# Patient Record
Sex: FEMALE | Race: WHITE | Employment: OTHER | URBAN - METROPOLITAN AREA
[De-identification: names, ages, dates, MRNs, and addresses within clinical notes are randomized per-mention and may not be internally consistent; named-entity substitution may affect disease eponyms.]

---

## 2019-03-04 ENCOUNTER — HOSPITAL ENCOUNTER (OUTPATIENT)
Dept: ULTRASOUND IMAGING | Age: 74
Discharge: HOME | End: 2019-03-04
Attending: INTERNAL MEDICINE
Payer: MEDICARE

## 2019-03-04 DIAGNOSIS — E04.2 MULTINODULAR GOITER: ICD-10-CM

## 2019-03-04 DIAGNOSIS — E04.1 THYROID NODULE: ICD-10-CM

## 2019-03-04 PROCEDURE — 76536 US EXAM OF HEAD AND NECK: CPT

## 2019-03-05 NOTE — PROGRESS NOTES
Talked to pt and informed her of US result, referral to Endocrinology and she will be notified by them to schedule appt.

## 2019-03-15 ENCOUNTER — PATIENT OUTREACH (OUTPATIENT)
Dept: CASE MANAGEMENT | Age: 74
End: 2019-03-15

## 2019-03-15 NOTE — PROGRESS NOTES
High risk review. Left voicemail with my name and number requesting a call back. This note will not be viewable in [address]

## 2019-03-26 PROBLEM — R06.02 SHORTNESS OF BREATH: Status: ACTIVE | Noted: 2019-03-26

## 2019-04-29 ENCOUNTER — HOSPITAL ENCOUNTER (OUTPATIENT)
Dept: GENERAL RADIOLOGY | Age: 74
Discharge: HOME | End: 2019-04-29
Attending: INTERNAL MEDICINE
Payer: MEDICARE

## 2019-04-29 DIAGNOSIS — R06.00 DYSPNEA, UNSPECIFIED TYPE: ICD-10-CM

## 2019-04-29 PROBLEM — E66.01 SEVERE OBESITY (HCC): Status: ACTIVE | Noted: 2019-04-29

## 2019-04-29 PROCEDURE — 71046 X-RAY EXAM CHEST 2 VIEWS: CPT

## 2019-05-05 ENCOUNTER — HOSPITAL ENCOUNTER (OUTPATIENT)
Dept: SLEEP MEDICINE | Age: 74
Discharge: HOME | End: 2019-05-05
Payer: MEDICARE

## 2019-05-05 PROCEDURE — 95810 POLYSOM 6/> YRS 4/> PARAM: CPT

## 2019-05-07 PROBLEM — E11.40 TYPE 2 DIABETES MELLITUS WITH DIABETIC NEUROPATHY (HCC): Status: ACTIVE | Noted: 2019-05-07

## 2019-05-07 PROBLEM — E11.40 TYPE 2 DIABETES MELLITUS WITH DIABETIC NEUROPATHY (HCC): Status: RESOLVED | Noted: 2019-05-07 | Resolved: 2019-05-07

## 2019-05-07 PROBLEM — R06.02 SHORTNESS OF BREATH: Status: RESOLVED | Noted: 2019-03-26 | Resolved: 2019-05-07

## 2019-07-02 ENCOUNTER — HOSPITAL ENCOUNTER (OUTPATIENT)
Dept: MAMMOGRAPHY | Age: 74
Discharge: HOME | End: 2019-07-02
Attending: INTERNAL MEDICINE
Payer: MEDICARE

## 2019-07-02 DIAGNOSIS — M94.9 DISORDER OF BONE AND CARTILAGE: ICD-10-CM

## 2019-07-02 DIAGNOSIS — Z12.31 VISIT FOR SCREENING MAMMOGRAM: ICD-10-CM

## 2019-07-02 DIAGNOSIS — M89.9 DISORDER OF BONE AND CARTILAGE: ICD-10-CM

## 2019-07-02 PROCEDURE — 77067 SCR MAMMO BI INCL CAD: CPT

## 2019-07-02 PROCEDURE — 77080 DXA BONE DENSITY AXIAL: CPT

## 2019-07-02 NOTE — PROGRESS NOTES
Bone density revealed osteoporosis; schedule ov to discuss results and treatment options; recommend daily calcium and vit D supplement weight bearing exercises.

## 2019-07-03 NOTE — PROGRESS NOTES
Talked to pt and informed her of BMD results, Dr. Dinesh Carter instructions and scheduled her for appt to discuss treatment options on Friday @ 9:40am.

## 2019-08-14 PROBLEM — Z99.89 OSA ON CPAP: Status: ACTIVE | Noted: 2019-08-14

## 2019-08-14 PROBLEM — G47.33 OSA ON CPAP: Status: ACTIVE | Noted: 2019-08-14

## 2020-11-10 PROBLEM — E11.21 TYPE 2 DIABETES WITH NEPHROPATHY (HCC): Status: ACTIVE | Noted: 2020-11-10

## 2021-08-12 ENCOUNTER — HOSPITAL ENCOUNTER (OUTPATIENT)
Dept: MAMMOGRAPHY | Age: 76
Discharge: HOME | End: 2021-08-12
Attending: INTERNAL MEDICINE
Payer: MEDICARE

## 2021-08-12 DIAGNOSIS — Z12.31 VISIT FOR SCREENING MAMMOGRAM: ICD-10-CM

## 2021-08-12 DIAGNOSIS — M81.0 AGE-RELATED OSTEOPOROSIS WITHOUT CURRENT PATHOLOGICAL FRACTURE: ICD-10-CM

## 2021-08-12 PROCEDURE — 77080 DXA BONE DENSITY AXIAL: CPT

## 2021-08-12 PROCEDURE — 77067 SCR MAMMO BI INCL CAD: CPT

## 2021-08-16 NOTE — PROGRESS NOTES
Talked to pt and informed her, per Dr. Eric Bruce, bone density revealed osteoporosis, will review report in detail at upcoming visit.

## 2022-03-18 PROBLEM — E11.21 TYPE 2 DIABETES WITH NEPHROPATHY (HCC): Status: ACTIVE | Noted: 2020-11-10

## 2022-03-19 PROBLEM — Z99.89 OSA ON CPAP: Status: ACTIVE | Noted: 2019-08-14

## 2022-03-19 PROBLEM — G47.33 OSA ON CPAP: Status: ACTIVE | Noted: 2019-08-14

## 2022-03-19 PROBLEM — E66.01 SEVERE OBESITY (HCC): Status: ACTIVE | Noted: 2019-04-29

## 2022-08-19 ENCOUNTER — OFFICE VISIT (OUTPATIENT)
Dept: INTERNAL MEDICINE CLINIC | Facility: CLINIC | Age: 77
End: 2022-08-19
Payer: MEDICARE

## 2022-08-19 VITALS
RESPIRATION RATE: 18 BRPM | HEART RATE: 82 BPM | SYSTOLIC BLOOD PRESSURE: 150 MMHG | WEIGHT: 181 LBS | TEMPERATURE: 97 F | BODY MASS INDEX: 33.31 KG/M2 | HEIGHT: 62 IN | OXYGEN SATURATION: 98 % | DIASTOLIC BLOOD PRESSURE: 100 MMHG

## 2022-08-19 DIAGNOSIS — Z12.31 SCREENING MAMMOGRAM FOR BREAST CANCER: ICD-10-CM

## 2022-08-19 DIAGNOSIS — M81.0 AGE-RELATED OSTEOPOROSIS WITHOUT CURRENT PATHOLOGICAL FRACTURE: ICD-10-CM

## 2022-08-19 DIAGNOSIS — R42 VERTIGO: ICD-10-CM

## 2022-08-19 DIAGNOSIS — T78.2XXA ACUTE ANAPHYLAXIS, INITIAL ENCOUNTER: ICD-10-CM

## 2022-08-19 DIAGNOSIS — I27.20 PULMONARY HYPERTENSION (HCC): ICD-10-CM

## 2022-08-19 DIAGNOSIS — Z79.4 TYPE 2 DIABETES MELLITUS WITH DIABETIC NEPHROPATHY, WITH LONG-TERM CURRENT USE OF INSULIN (HCC): ICD-10-CM

## 2022-08-19 DIAGNOSIS — I10 ESSENTIAL HYPERTENSION: ICD-10-CM

## 2022-08-19 DIAGNOSIS — E11.21 TYPE 2 DIABETES MELLITUS WITH DIABETIC NEPHROPATHY, WITH LONG-TERM CURRENT USE OF INSULIN (HCC): ICD-10-CM

## 2022-08-19 DIAGNOSIS — Z00.00 MEDICARE ANNUAL WELLNESS VISIT, SUBSEQUENT: Primary | ICD-10-CM

## 2022-08-19 PROBLEM — J44.1 CHRONIC OBSTRUCTIVE PULMONARY DISEASE WITH (ACUTE) EXACERBATION (HCC): Status: ACTIVE | Noted: 2022-08-19

## 2022-08-19 PROCEDURE — G8399 PT W/DXA RESULTS DOCUMENT: HCPCS | Performed by: NURSE PRACTITIONER

## 2022-08-19 PROCEDURE — G8427 DOCREV CUR MEDS BY ELIG CLIN: HCPCS | Performed by: NURSE PRACTITIONER

## 2022-08-19 PROCEDURE — 3017F COLORECTAL CA SCREEN DOC REV: CPT | Performed by: NURSE PRACTITIONER

## 2022-08-19 PROCEDURE — 3046F HEMOGLOBIN A1C LEVEL >9.0%: CPT | Performed by: NURSE PRACTITIONER

## 2022-08-19 PROCEDURE — G8417 CALC BMI ABV UP PARAM F/U: HCPCS | Performed by: NURSE PRACTITIONER

## 2022-08-19 PROCEDURE — 1123F ACP DISCUSS/DSCN MKR DOCD: CPT | Performed by: NURSE PRACTITIONER

## 2022-08-19 PROCEDURE — 2022F DILAT RTA XM EVC RTNOPTHY: CPT | Performed by: NURSE PRACTITIONER

## 2022-08-19 PROCEDURE — 1090F PRES/ABSN URINE INCON ASSESS: CPT | Performed by: NURSE PRACTITIONER

## 2022-08-19 PROCEDURE — G0439 PPPS, SUBSEQ VISIT: HCPCS | Performed by: NURSE PRACTITIONER

## 2022-08-19 PROCEDURE — 1036F TOBACCO NON-USER: CPT | Performed by: NURSE PRACTITIONER

## 2022-08-19 PROCEDURE — 99214 OFFICE O/P EST MOD 30 MIN: CPT | Performed by: NURSE PRACTITIONER

## 2022-08-19 RX ORDER — MECLIZINE HYDROCHLORIDE 25 MG/1
25 TABLET ORAL 3 TIMES DAILY PRN
Qty: 30 TABLET | Refills: 0 | Status: SHIPPED | OUTPATIENT
Start: 2022-08-19 | End: 2022-08-29

## 2022-08-19 RX ORDER — ALENDRONATE SODIUM 70 MG/1
70 TABLET ORAL
Qty: 13 TABLET | Refills: 1 | Status: SHIPPED | OUTPATIENT
Start: 2022-08-19

## 2022-08-19 RX ORDER — EPINEPHRINE 0.15 MG/.3ML
INJECTION INTRAMUSCULAR
Qty: 2 EACH | Refills: 2 | Status: SHIPPED | OUTPATIENT
Start: 2022-08-19

## 2022-08-19 RX ORDER — OYSTER SHELL CALCIUM WITH VITAMIN D 500; 200 MG/1; [IU]/1
1 TABLET, FILM COATED ORAL DAILY
COMMUNITY

## 2022-08-19 RX ORDER — INSULIN DEGLUDEC 200 U/ML
INJECTION, SOLUTION SUBCUTANEOUS
COMMUNITY
Start: 2021-09-30 | End: 2022-08-19 | Stop reason: SDUPTHER

## 2022-08-19 RX ORDER — INSULIN DEGLUDEC 200 U/ML
30 INJECTION, SOLUTION SUBCUTANEOUS DAILY
Qty: 27 ML | Refills: 1 | Status: SHIPPED | OUTPATIENT
Start: 2022-08-19

## 2022-08-19 RX ORDER — ALENDRONATE SODIUM 70 MG/1
TABLET ORAL
COMMUNITY
End: 2022-08-19 | Stop reason: SDUPTHER

## 2022-08-19 RX ORDER — OLMESARTAN MEDOXOMIL 40 MG/1
40 TABLET ORAL DAILY
COMMUNITY
End: 2022-08-19 | Stop reason: SDUPTHER

## 2022-08-19 RX ORDER — ATENOLOL 50 MG/1
75 TABLET ORAL DAILY
Qty: 135 TABLET | Refills: 1 | Status: SHIPPED | OUTPATIENT
Start: 2022-08-19

## 2022-08-19 RX ORDER — OLMESARTAN MEDOXOMIL 40 MG/1
40 TABLET ORAL DAILY
Qty: 90 TABLET | Refills: 1 | Status: SHIPPED | OUTPATIENT
Start: 2022-08-19

## 2022-08-19 ASSESSMENT — PATIENT HEALTH QUESTIONNAIRE - PHQ9
SUM OF ALL RESPONSES TO PHQ QUESTIONS 1-9: 1
2. FEELING DOWN, DEPRESSED OR HOPELESS: 0
SUM OF ALL RESPONSES TO PHQ QUESTIONS 1-9: 1
1. LITTLE INTEREST OR PLEASURE IN DOING THINGS: 1
SUM OF ALL RESPONSES TO PHQ QUESTIONS 1-9: 1
SUM OF ALL RESPONSES TO PHQ9 QUESTIONS 1 & 2: 1
SUM OF ALL RESPONSES TO PHQ QUESTIONS 1-9: 1

## 2022-08-19 ASSESSMENT — LIFESTYLE VARIABLES
HOW MANY STANDARD DRINKS CONTAINING ALCOHOL DO YOU HAVE ON A TYPICAL DAY: PATIENT DOES NOT DRINK
HOW OFTEN DO YOU HAVE A DRINK CONTAINING ALCOHOL: NEVER

## 2022-08-19 NOTE — PATIENT INSTRUCTIONS
Personalized Preventive Plan for Allyson Zaman - 8/19/2022  Medicare offers a range of preventive health benefits. Some of the tests and screenings are paid in full while other may be subject to a deductible, co-insurance, and/or copay. Some of these benefits include a comprehensive review of your medical history including lifestyle, illnesses that may run in your family, and various assessments and screenings as appropriate. After reviewing your medical record and screening and assessments performed today your provider may have ordered immunizations, labs, imaging, and/or referrals for you. A list of these orders (if applicable) as well as your Preventive Care list are included within your After Visit Summary for your review. Other Preventive Recommendations:    A preventive eye exam performed by an eye specialist is recommended every 1-2 years to screen for glaucoma; cataracts, macular degeneration, and other eye disorders. A preventive dental visit is recommended every 6 months. Try to get at least 150 minutes of exercise per week or 10,000 steps per day on a pedometer . Order or download the FREE \"Exercise & Physical Activity: Your Everyday Guide\" from [employer]. Call 7-412.828.9309 or search [employer] online. You need 6199-5738 mg of calcium and 2839-0960 IU of vitamin D per day. It is possible to meet your calcium requirement with diet alone, but a vitamin D supplement is usually necessary to meet this goal.  When exposed to the sun, use a sunscreen that protects against both UVA and UVB radiation with an SPF of 30 or greater. Reapply every 2 to 3 hours or after sweating, drying off with a towel, or swimming. Always wear a seat belt when traveling in a car. Always wear a helmet when riding a bicycle or motorcycle.

## 2022-08-19 NOTE — PROGRESS NOTES
Medicare Annual Wellness Visit    Stew Tabares is here for Medicare AWV (A1c check)    Assessment & Plan   Medicare annual wellness visit, subsequent    Type 2 diabetes mellitus with diabetic nephropathy, with long-term current use of insulin (HCC)  -     Insulin Degludec (TRESIBA FLEXTOUCH) 200 UNIT/ML SOPN; Inject 30 Units into the skin daily, Disp-27 mL, R-1Normal  -     SITagliptin (JANUVIA) 100 MG tablet; Take 1 tablet by mouth daily, Disp-90 tablet, R-1Normal  -     Comprehensive Metabolic Panel; Future  -     Microalbumin / Creatinine Urine Ratio; Future  -     Hemoglobin A1C; Future  -      DIABETES FOOT EXAM  Check A1c next week. Continue Jardiance as prescribed. Administer Tresiba at 30 units daily. Patient is to call if problems with hypoglycemia. Essential hypertension  -     atenolol (TENORMIN) 50 MG tablet; Take 1.5 tablets by mouth daily, Disp-135 tablet, R-1Normal  -     olmesartan (BENICAR) 40 MG tablet; Take 1 tablet by mouth daily, Disp-90 tablet, R-1Normal  -     CBC with Auto Differential; Future  -     Comprehensive Metabolic Panel; Future  -     Lipid Panel; Future  -     T4, Free; Future  -     TSH; Future  -     Urinalysis; Future  Continue olmesartan. Increase atenolol to 75 mg daily. Patient instructed to monitor BP at home and call for readings consistently greater than 140/90. Pulmonary hypertension ([facility] [address]  -     External Referral to Pulmonology    Age-related osteoporosis without current pathological fracture  -     alendronate (FOSAMAX) 70 MG tablet; Take 1 tablet by mouth every 7 days, Disp-13 tablet, R-1Normal    Vertigo  -     meclizine (ANTIVERT) 25 MG tablet; Take 1 tablet by mouth 3 times daily as needed for Dizziness or Nausea, Disp-30 tablet, R-0Normal  Take meclizine as needed. Screening mammogram for breast cancer  -     [facility] DIGITAL SCREEN W OR WO CAD BILATERAL;  Future    Acute anaphylaxis, initial encounter  -     EPINEPHrine (EPIPEN JR) 0.15 MG/0.3ML SOAJ; Inject 0.3 mL (0.15 mg) into the muscle once as needed for anaphylaxis. , Disp-2 each, R-2Normal        Recommendations for Preventive Services Due: see orders and patient instructions/AVS.  Recommended screening schedule for the next 5-10 years is provided to the patient in written form: see Patient Instructions/AVS.     Return in 6 months (on 2/19/2023). Subjective   The following acute and/or chronic problems were also addressed today:    Patient is due for fasting labs. Most recent A1c (July 2021) was 7.5%. Patient is due for repeat fasting lab work. Fasting blood sugar readings at home have been in the range of 100-150 with occasional readings up to 200 which occurs when she eats ice cream or other desserts before bed. She reports compliance with Jardiance as prescribed. Admits that she administers a different amount of Tresiba daily based on what her morning blood sugar is. She reports administering between 20-30 units daily typically. BP is elevated today in the office. She checks her BP 2-3 times weekly with readings ranging from 150/160s/. Denies any CP. Reports chronic SOB with exertion. Previously diagnosed with pulmonary hypertension. Daughter, who is present today, is requesting referral to pulmonary at [facility] for further evaluation and management of her SOB. Compliant with Fosamax as prescribed. Denies any recent falls. History of vertigo. Patient reports two recent episodes, both of which were relieved by patient lying down and taking a nap. Reports right ear tinnitus as well. Patient's complete Health Risk Assessment and screening values have been reviewed and are found in Flowsheets. The following problems were reviewed today and where indicated follow up appointments were made and/or referrals ordered. Labs reviewed and discussed. Medications refilled as needed during office visit or adjusted based on lab results and/or our discussion as appropriate. See discussion.       Positive Risk Factor Screenings with Interventions:             General Health and ACP:  General  In general, how would you say your health is?: Good  In the past 7 days, have you experienced any of the following: New or Increased Pain, New or Increased Fatigue, Loneliness, Social Isolation, Stress or Anger?: (!) Yes  Select all that apply: (!) New or Increased Fatigue  Do you get the social and emotional support that you need?: Yes  Do you have a Living Will?: Yes    Advance Directives       Power of  Living Will ACP-Advance Directive ACP-Power of     Not on File Not on File Not on File Not on File          General Health Risk Interventions:  Fatigue: regular exercise recommended- 3-5 times per week, 30-45 minutes per session, patient declines any further evaluation/treatment for this issue    Health Habits/Nutrition:  Physical Activity: Insufficiently Active    Days of Exercise per Week: 3 days    Minutes of Exercise per Session: 20 min     Have you lost any weight without trying in the past 3 months?: No  Body mass index: (!) 33.1  Have you seen the dentist within the past year?: (!) No  Health Habits/Nutrition Interventions:  Inadequate physical activity:  educational materials provided to promote increased physical activity  Nutritional issues:  educational materials for healthy, well-balanced diet provided      ADLs:  In the past 7 days, did you need help from others to perform any of the following everyday activities: Eating, dressing, grooming, bathing, toileting, or walking/balance?: No  In the past 7 days, did you need help from others to take care of any of the following: Laundry, housekeeping, banking/finances, shopping, telephone use, food preparation, transportation, or taking medications?: (!) Yes  Select all that apply: (!) Transportation  ADL Interventions:  Patient declines any further evaluation/treatment for this issue      Review of Systems   Constitutional: Positive for fatigue. Negative for chills, fever and unexpected weight change. HENT:  Positive for tinnitus. Negative for congestion, ear pain and sore throat. Respiratory:  Positive for shortness of breath. Negative for cough and wheezing. Cardiovascular:  Negative for chest pain, palpitations and leg swelling. Gastrointestinal:  Negative for abdominal pain, constipation, diarrhea, nausea and vomiting. Genitourinary:  Negative for dysuria and hematuria. Neurological:  Positive for dizziness. Negative for light-headedness and headaches. Objective   Vitals:    08/19/22 1441   BP: (!) 150/100   Site: Left Upper Arm   Position: Sitting   Cuff Size: Medium Adult   Pulse: 82   Resp: 18   Temp: 97 °F (36.1 °C)   TempSrc: Temporal   SpO2: 98%   Weight: 181 lb (82.1 kg)   Height: 5' 2\" (1.575 m)      Body mass index is 33.11 kg/m². Physical Exam  Vitals and nursing note reviewed. Constitutional:       General: She is not in acute distress. Appearance: Normal appearance. HENT:      Right Ear: Tympanic membrane, ear canal and external ear normal.      Left Ear: Tympanic membrane, ear canal and external ear normal.      Nose: Nose normal.      Mouth/Throat:      Mouth: Mucous membranes are moist.   Cardiovascular:      Rate and Rhythm: Normal rate and regular rhythm. Pulses:           Dorsalis pedis pulses are 2+ on the right side and 2+ on the left side. Heart sounds: Normal heart sounds. Pulmonary:      Effort: Pulmonary effort is normal.      Breath sounds: Normal breath sounds. Abdominal:      General: Bowel sounds are normal.      Palpations: Abdomen is soft. Tenderness: There is no abdominal tenderness. Musculoskeletal:      Right lower leg: No edema. Left lower leg: No edema. Feet:      Right foot:      Protective Sensation: 10 sites tested. 10 sites sensed. Skin integrity: Skin integrity normal.      Toenail Condition: Fungal disease present. Left foot:      Protective Sensation: 10 sites tested. 10 sites sensed. Skin integrity: Skin integrity normal.      Toenail Condition: Fungal disease present. Skin:     General: Skin is warm and dry. Neurological:      Mental Status: She is alert and oriented to person, place, and time. Cranial Nerves: No cranial nerve deficit. Psychiatric:         Mood and Affect: Mood normal.            Allergies   Allergen Reactions    Other Anaphylaxis and Rash     Latex): Anaphylactic shock-Allergy    Diazepam Other (See Comments)     Adverse Reaction - makes pt hyper. Pseudoephedrine Hcl Other (See Comments)     Altered Mental State    Sulfamethoxazole-Trimethoprim Hives and Swelling     Prior to Visit Medications    Medication Sig Taking? Authorizing Provider   calcium-vitamin D (OSCAL-500) 500-200 MG-UNIT per tablet Take 1 tablet by mouth daily Yes Historical Provider, MD   alendronate (FOSAMAX) 70 MG tablet Take 1 tablet by mouth every 7 days Yes ESTHER Babcock CNP   atenolol (TENORMIN) 50 MG tablet Take 1.5 tablets by mouth daily Yes ESTHER Babcock CNP   EPINEPHrine (EPIPEN JR) 0.15 MG/0.3ML SOAJ Inject 0.3 mL (0.15 mg) into the muscle once as needed for anaphylaxis.  Yes ESTHER Babcock CNP   Insulin Degludec (TRESIBA FLEXTOUCH) 200 UNIT/ML SOPN Inject 30 Units into the skin daily Yes ESTHER Babcock CNP   olmesartan (BENICAR) 40 MG tablet Take 1 tablet by mouth daily Yes ESTHER Babcock CNP   SITagliptin (JANUVIA) 100 MG tablet Take 1 tablet by mouth daily Yes ESTHER Babcock CNP   meclizine (ANTIVERT) 25 MG tablet Take 1 tablet by mouth 3 times daily as needed for Dizziness or Nausea Yes ESTHER Babcock CNP   Lancets MISC Check fs tid; Freestyle lite; DM2, E11.9 Yes Ar Automatic Reconciliation   acetaminophen (TYLENOL) 325 MG tablet Take by mouth every 4 hours as needed Yes Ar Automatic Reconciliation   ergocalciferol (ERGOCALCIFEROL) 1.25 MG (56752 UT) capsule TAKE 1 CAP BY MOUTH EVERY SEVEN (7) DAYS FOR 90 DAYS. Yes Ar Automatic Reconciliation   gabapentin (NEURONTIN) 100 MG capsule Take 300 mg by mouth.   Patient not taking: Reported on 8/19/2022  Ar Automatic Reconciliation       CareTeam (Including outside providers/suppliers regularly involved in providing care):   Patient Care Team:  Kandy Hou MD as PCP - Zeynep Meade MD as PCP - [facility] Empaneled Provider     Reviewed and updated this visit:  Allergies  Meds  Med Hx  Surg Hx  Soc Hx  Fam Hx

## 2022-08-21 ASSESSMENT — ENCOUNTER SYMPTOMS
SHORTNESS OF BREATH: 1
COUGH: 0
CONSTIPATION: 0
VOMITING: 0
ABDOMINAL PAIN: 0
WHEEZING: 0
NAUSEA: 0
DIARRHEA: 0
SORE THROAT: 0

## 2022-08-24 LAB
AVERAGE GLUCOSE: ABNORMAL
HBA1C MFR BLD: 8 %

## 2022-08-26 ENCOUNTER — TELEPHONE (OUTPATIENT)
Dept: INTERNAL MEDICINE CLINIC | Facility: CLINIC | Age: 77
End: 2022-08-26

## 2022-08-26 NOTE — TELEPHONE ENCOUNTER
Pt was called and she would like to have the adult Epipen of 0.3mg. She was taking Epipen Jr 0.15. I will call the pharmacy to make the change.     Thank you    Marisol Massey

## 2022-08-30 ENCOUNTER — TELEPHONE (OUTPATIENT)
Dept: INTERNAL MEDICINE CLINIC | Facility: CLINIC | Age: 77
End: 2022-08-30

## 2022-08-30 NOTE — TELEPHONE ENCOUNTER
[facility] the nurse coordinator from Pulmonary hypertension of [facility] called in regards to the pt of not knowing why she is being referred for pulmonary hypertension. The nurse coordinator not making an appointment until she gets a phone call back. Please advise so I can call her back with a reason.     Thank you    Gavin Gordon

## 2022-08-30 NOTE — TELEPHONE ENCOUNTER
I have spoken with Amy Doyle and she mention the pt has to have had an echo or right heart cath for them to view pulmonary hypertension. . She is unable to view stress test for pulmonary hypertension.     Thank you    Andrea Briggs

## 2022-09-02 ENCOUNTER — TELEPHONE (OUTPATIENT)
Dept: INTERNAL MEDICINE CLINIC | Facility: CLINIC | Age: 77
End: 2022-09-02

## 2022-09-02 ENCOUNTER — HOSPITAL ENCOUNTER (OUTPATIENT)
Dept: MAMMOGRAPHY | Age: 77
Discharge: HOME | End: 2022-09-05
Payer: MEDICARE

## 2022-09-02 DIAGNOSIS — Z12.31 SCREENING MAMMOGRAM FOR BREAST CANCER: ICD-10-CM

## 2022-09-02 DIAGNOSIS — R06.02 CHRONIC SHORTNESS OF BREATH: Primary | ICD-10-CM

## 2022-09-02 PROCEDURE — 77067 SCR MAMMO BI INCL CAD: CPT

## 2022-09-02 NOTE — TELEPHONE ENCOUNTER
Please call patient to let her know that we received her lab work from [employer]. Kidney and liver function normal. WBC count slightly elevated. Please confirm no signs/symptoms of infection. LDL bad cholesterol slightly elevated. Recommend low fat diet and exercise as tolerated. A1c is at 8.0%. I would like for her to continue Jardiance as prescribed and adminster her Tresiba 30 units daily as we discussed.  Thyroid is normal.

## 2022-09-06 NOTE — TELEPHONE ENCOUNTER
Pt was finally notified about lab results. She have a scrape from concrete that she have applying neosporin on a week from tomorrow. The pt says the wound is healing nicely.     Thank you    Emily Santacruz

## 2022-09-06 NOTE — TELEPHONE ENCOUNTER
Good morning,    I have spoken with Blayne Klein this morning. A copy of the pt echocardiogram report will be faxed over today to be looked at.     Thank you    Sendy Sherman

## 2022-09-07 ENCOUNTER — CLINICAL DOCUMENTATION (OUTPATIENT)
Dept: INTERNAL MEDICINE CLINIC | Facility: CLINIC | Age: 77
End: 2022-09-07

## 2022-09-07 NOTE — PROGRESS NOTES
Pt new Rx for Epinephrine Auto-injector 2 pk will be dispensed as a quantity of 1 Inject 0.3 ml(0.3 mg) into the muscle once as needed for anaphylaxis with 2 refills.

## 2022-10-12 ENCOUNTER — TELEPHONE (OUTPATIENT)
Dept: INTERNAL MEDICINE CLINIC | Facility: CLINIC | Age: 77
End: 2022-10-12

## 2022-10-12 NOTE — TELEPHONE ENCOUNTER
Dr. Arina Sprague from [facility] in [facility] would like you to call him to discuss this mutual pt. He says his consult notes are in [place]. Please give him a call. Thank you.

## 2023-01-23 ENCOUNTER — TELEPHONE (OUTPATIENT)
Dept: INTERNAL MEDICINE CLINIC | Facility: CLINIC | Age: 78
End: 2023-01-23

## 2023-01-23 NOTE — TELEPHONE ENCOUNTER
ANAOVMOSES of pt's daughter, Nicole Ramachandran, asking her to call us back to schedule pt for a hospital follow up.

## 2023-01-25 ENCOUNTER — TELEPHONE (OUTPATIENT)
Dept: INTERNAL MEDICINE CLINIC | Facility: CLINIC | Age: 78
End: 2023-01-25

## 2023-02-03 DIAGNOSIS — M81.0 AGE-RELATED OSTEOPOROSIS WITHOUT CURRENT PATHOLOGICAL FRACTURE: ICD-10-CM

## 2023-02-03 RX ORDER — ALENDRONATE SODIUM 70 MG/1
TABLET ORAL
Qty: 12 TABLET | Refills: 3 | OUTPATIENT
Start: 2023-02-03

## 2023-02-08 ENCOUNTER — TELEPHONE (OUTPATIENT)
Dept: INTERNAL MEDICINE CLINIC | Facility: CLINIC | Age: 78
End: 2023-02-08

## 2023-02-08 NOTE — TELEPHONE ENCOUNTER
April, PT w/ [facility], called and stated she saw pt yesterday morning and she was sitting on the floor. Due to vertigo, she lost her balance, fell to the couch and slid down to the floor. She c/o vomiting, but refused to go to ED. Blood pressure was elevated at 176/82. Once they got her to the bed and she sat for a while, her blood pressure was rechecked and was 152/74. Pt has hospital follow up w/ Devon Taveras NP 02/20/23.

## 2023-02-08 NOTE — TELEPHONE ENCOUNTER
Can she do virtual visit with me this Friday?  If not, keep appt already scheduled, go to ED if worsening symptoms;

## 2023-02-08 NOTE — TELEPHONE ENCOUNTER
Talked to pt and she said she's doing fine and will wait until her appt w/ Kayla Cochran on 02/20 for Hospital Follow up.

## 2023-02-20 ENCOUNTER — OFFICE VISIT (OUTPATIENT)
Dept: INTERNAL MEDICINE CLINIC | Facility: CLINIC | Age: 78
End: 2023-02-20
Payer: MEDICARE

## 2023-02-20 VITALS
BODY MASS INDEX: 33.49 KG/M2 | DIASTOLIC BLOOD PRESSURE: 82 MMHG | RESPIRATION RATE: 16 BRPM | HEART RATE: 70 BPM | TEMPERATURE: 97.2 F | WEIGHT: 182 LBS | OXYGEN SATURATION: 98 % | HEIGHT: 62 IN | SYSTOLIC BLOOD PRESSURE: 150 MMHG

## 2023-02-20 DIAGNOSIS — Z91.81 AT HIGH RISK FOR FALLS: ICD-10-CM

## 2023-02-20 DIAGNOSIS — Z09 HOSPITAL DISCHARGE FOLLOW-UP: Primary | ICD-10-CM

## 2023-02-20 DIAGNOSIS — I48.92 ATRIAL FLUTTER, UNSPECIFIED TYPE (HCC): ICD-10-CM

## 2023-02-20 DIAGNOSIS — I10 ESSENTIAL HYPERTENSION: ICD-10-CM

## 2023-02-20 DIAGNOSIS — I63.9 CEREBROVASCULAR ACCIDENT (CVA), UNSPECIFIED MECHANISM (HCC): ICD-10-CM

## 2023-02-20 PROCEDURE — G8399 PT W/DXA RESULTS DOCUMENT: HCPCS | Performed by: NURSE PRACTITIONER

## 2023-02-20 PROCEDURE — G8484 FLU IMMUNIZE NO ADMIN: HCPCS | Performed by: NURSE PRACTITIONER

## 2023-02-20 PROCEDURE — 3078F DIAST BP <80 MM HG: CPT | Performed by: NURSE PRACTITIONER

## 2023-02-20 PROCEDURE — 1123F ACP DISCUSS/DSCN MKR DOCD: CPT | Performed by: NURSE PRACTITIONER

## 2023-02-20 PROCEDURE — 3074F SYST BP LT 130 MM HG: CPT | Performed by: NURSE PRACTITIONER

## 2023-02-20 PROCEDURE — 1090F PRES/ABSN URINE INCON ASSESS: CPT | Performed by: NURSE PRACTITIONER

## 2023-02-20 PROCEDURE — 1111F DSCHRG MED/CURRENT MED MERGE: CPT | Performed by: NURSE PRACTITIONER

## 2023-02-20 PROCEDURE — 99215 OFFICE O/P EST HI 40 MIN: CPT | Performed by: NURSE PRACTITIONER

## 2023-02-20 PROCEDURE — 1036F TOBACCO NON-USER: CPT | Performed by: NURSE PRACTITIONER

## 2023-02-20 PROCEDURE — G8427 DOCREV CUR MEDS BY ELIG CLIN: HCPCS | Performed by: NURSE PRACTITIONER

## 2023-02-20 PROCEDURE — 3017F COLORECTAL CA SCREEN DOC REV: CPT | Performed by: NURSE PRACTITIONER

## 2023-02-20 PROCEDURE — G8417 CALC BMI ABV UP PARAM F/U: HCPCS | Performed by: NURSE PRACTITIONER

## 2023-02-20 RX ORDER — APIXABAN 5 MG/1
TABLET, FILM COATED ORAL
COMMUNITY
Start: 2023-02-02

## 2023-02-20 RX ORDER — CARVEDILOL 12.5 MG/1
12.5 TABLET ORAL 2 TIMES DAILY WITH MEALS
Qty: 180 TABLET | Refills: 1 | Status: SHIPPED | OUTPATIENT
Start: 2023-02-20

## 2023-02-20 RX ORDER — ATORVASTATIN CALCIUM 40 MG/1
TABLET, FILM COATED ORAL
COMMUNITY
Start: 2023-02-02

## 2023-02-20 RX ORDER — CHOLECALCIFEROL (VITAMIN D3) 125 MCG
5 CAPSULE ORAL DAILY
COMMUNITY

## 2023-02-20 RX ORDER — CARVEDILOL 6.25 MG/1
TABLET ORAL
COMMUNITY
Start: 2023-02-02 | End: 2023-02-20 | Stop reason: SDUPTHER

## 2023-02-20 RX ORDER — AMLODIPINE BESYLATE 5 MG/1
TABLET ORAL
COMMUNITY
Start: 2023-02-02

## 2023-02-20 SDOH — ECONOMIC STABILITY: FOOD INSECURITY: WITHIN THE PAST 12 MONTHS, YOU WORRIED THAT YOUR FOOD WOULD RUN OUT BEFORE YOU GOT MONEY TO BUY MORE.: NEVER TRUE

## 2023-02-20 SDOH — ECONOMIC STABILITY: HOUSING INSECURITY
IN THE LAST 12 MONTHS, WAS THERE A TIME WHEN YOU DID NOT HAVE A STEADY PLACE TO SLEEP OR SLEPT IN A SHELTER (INCLUDING NOW)?: NO

## 2023-02-20 SDOH — ECONOMIC STABILITY: INCOME INSECURITY: HOW HARD IS IT FOR YOU TO PAY FOR THE VERY BASICS LIKE FOOD, HOUSING, MEDICAL CARE, AND HEATING?: NOT HARD AT ALL

## 2023-02-20 SDOH — ECONOMIC STABILITY: FOOD INSECURITY: WITHIN THE PAST 12 MONTHS, THE FOOD YOU BOUGHT JUST DIDN'T LAST AND YOU DIDN'T HAVE MONEY TO GET MORE.: NEVER TRUE

## 2023-02-20 ASSESSMENT — PATIENT HEALTH QUESTIONNAIRE - PHQ9
SUM OF ALL RESPONSES TO PHQ QUESTIONS 1-9: 0
2. FEELING DOWN, DEPRESSED OR HOPELESS: 0
1. LITTLE INTEREST OR PLEASURE IN DOING THINGS: 0
SUM OF ALL RESPONSES TO PHQ QUESTIONS 1-9: 0
SUM OF ALL RESPONSES TO PHQ9 QUESTIONS 1 & 2: 0
SUM OF ALL RESPONSES TO PHQ QUESTIONS 1-9: 0
SUM OF ALL RESPONSES TO PHQ QUESTIONS 1-9: 0

## 2023-02-20 NOTE — PROGRESS NOTES
Post-Discharge Transitional Care Follow Up    Satnam Montgomery   YOB: 1945    Date of Office Visit:  2/20/2023  Date of Hospital Admission: 01/19/23  Date of Hospital Discharge: 01/24/23    Care management risk score Rising risk (score 2-5) and Complex Care (Scores >=6): No Risk Score On File     Non face to face  following discharge, date last encounter closed (first attempt may have been earlier): *No documented post hospital discharge outreach found in the last 14 days     Call initiated 2 business days of discharge: *No response recorded in the last 14 days    ASSESSMENT/PLAN:   Hospital discharge follow-up  -     [facility] MED LIST    Cerebrovascular accident (CVA), unspecified mechanism ([address]  -     [address]  Continue statin, Eliquis and aspirin. Continue PT, ST.     Atrial flutter, unspecified type [facility])  -     [address]. Referral to cardiology placed. Essential hypertension  -     carvedilol (COREG) 12.5 MG tablet; Take 1 tablet by mouth 2 times daily (with meals), Disp-180 tablet, R-1Normal  Poor control. Increase Coreg to 12.5 mg BID today. Continue olmesartan at current dose. Bilateral LE edema. Patient is resistant to diuretics. Recommended compression stockings, low sodium diet, activity as tolerated and increased water intake. Continue amlodipine for now, although may need to consider stopping it in the future if swelling does not improve. Patient is scheduled to return for routine follow-up on 3/1/23. Reevaluate BP and swelling at that time. At high risk for falls  Continue PT. Medical Decision Making: high complexity  Return in 9 days (on 3/1/2023) for previously scheduled routine follow-up or sooner if needed.     On this date 2/20/2023 I have spent 45 minutes reviewing previous notes, test results and face to face with the patient discussing the diagnosis and importance of compliance with the treatment plan as well as documenting on the day of the visit. Subjective:   HPI    Inpatient course: Discharge summary reviewed- see chart. Interval history/Current status:       Swathi Torres is a 78 y.o. female with medical history significant for HTN, diabetes type 2 on insulin, obesity (BMI 35.33), asthma, HLD, who presented to [facility] ED on 1/19/2023 for slurred speech x2 days. Her ophthalmologist directed her to the ED for slurred speech at her visit that day. Her daughter at bedside reported patient was at baseline aside from the slurred speech. Patient denied facial droop, difficulty swallowing, vision changes, paresthesias, weakness, tingling, changes in balance, changes in gait. No known prior ischemic events. She does have a history of cardiac arrhythmias. She reports occasional PVCs and remote episode of SVT requiring adenosine conversion. In the ED, patient was found to have elevated BP of 227/101, 43 HR, normal temp, normal spO2 on room air, not tachypneic, NIHSS of 1 for dysarthria, sodium 134, potassium 4.7, BUN 22, creatinine 1.01, glucose 278, GFR 58, PT/INR/PTT within normal limits, WBC 12.1, hemoglobin 15.9, hematocrit 50.4, and bigeminy on ECG. CT head without contrast was negative for acute intracranial changes. Patient received losartan 100 mg oral and hospitalist was consulted for admission to continue work-up of focal neurological deficit. Outpatient echocardiogram on 1/13/2023 ordered by pulmonologist Dr. Mary Oneal shows normal LV EF, LV hypertrophy, mild biatrial dilation, mildly reduced right RV systolic function, mild MR regurgitation. No accurate RSVP due to tricuspid valve not visualized. She is seeing Dr. Mary Oneal for workup of her pulmonary hypertension.  She does report worsening dyspnea over the past 3 months, now sometimes at rest.         Hospital Course:     --Acute ischemic stroke: Noted in the left basal ganglia with no hemorrhage or mass-effect noted on MRI. The patient's primary neurologic symptom was dysarthria as well as some aphasia. The symptoms have improved over the last 3 to 4 days. Etiology is presumed to be secondary to her atrial flutter rhythm. After discussions with neurology, the final recommendation was to treat with anticoagulation. She was initiated on apixaban. She will also continue on aspirin, as well as a high-intensity statin. The patient was instructed to continue on these medications indefinitely at this point. She will be transitioning to rehab at [facility] for further PT, OT, and speech therapy.    --Atrial flutter: Currently well controlled. We have switched her from atenolol to carvedilol. She was started on apixaban for stroke prophylaxis.    --Hypertensive urgency: Blood pressure was significantly elevated greater than 190 systolic. Blood pressure became much better controlled over the ensuing couple of days and was down to the 110s and 120s. We have modified her antihypertensive regimen and she is currently on amlodipine, carvedilol, and will resume her home olmesartan at discharge.    --Type 2 diabetes mellitus: Not optimally controlled as an outpatient with an A1c of 8.7%. She was treated with her usual home dose insulin glargine during the hospital stay.     Patient was discharged from rehab on 2/2/23 where she stayed for a week before going home. She now has [facility] at home with nursing, PT and ST. Released from OT. Speech is improved but not back to baseline. BP at home has been ranging from 140-170/80-90s. Patient denies any CP, palpitations, dizziness, headache. + mild lower extremity edema. She will be moving to [place] with one of her daughters in May.       Patient Active Problem List   Diagnosis    Type 2 diabetes with nephropathy (HCC)    Multinodular goiter    Vitamin D deficiency    Mild intermittent asthma    Type 2 diabetes mellitus with diabetic polyneuropathy, without  long-term current use of insulin (HCC)    LILIANA on CPAP    Seasonal allergic rhinitis    Severe obesity (HCC)    Hyperlipidemia    Age-related osteoporosis without current pathological fracture    Essential hypertension    Irritable bowel syndrome    Chronic obstructive pulmonary disease with (acute) exacerbation ([facility]       Medication list at time of discharge reviewed: Yes    Medications marked \"taking\" at this time  Outpatient Medications Marked as Taking for the 2/20/23 encounter (Office Visit) with ESTHER Allen - CNP   Medication Sig Dispense Refill    amLODIPine (NORVASC) 5 MG tablet TAKE 1 TABLET BY MOUTH TWICE A DAY      ELIQUIS 5 MG TABS tablet TAKE 1 TABLET BY MOUTH TWICE A DAY      atorvastatin (LIPITOR) 40 MG tablet TAKE 1 TABLET BY MOUTH EVERYDAY AT BEDTIME      melatonin 5 MG TABS tablet Take 5 mg by mouth daily      carvedilol (COREG) 12.5 MG tablet Take 1 tablet by mouth 2 times daily (with meals) 180 tablet 1    calcium-vitamin D (OSCAL-500) 500-200 MG-UNIT per tablet Take 1 tablet by mouth daily      EPINEPHrine (EPIPEN JR) 0.15 MG/0.3ML SOAJ Inject 0.3 mL (0.15 mg) into the muscle once as needed for anaphylaxis. 2 each 2    Insulin Degludec (TRESIBA FLEXTOUCH) 200 UNIT/ML SOPN Inject 30 Units into the skin daily 27 mL 1    olmesartan (BENICAR) 40 MG tablet Take 1 tablet by mouth daily 90 tablet 1    SITagliptin (JANUVIA) 100 MG tablet Take 1 tablet by mouth daily 90 tablet 1    Lancets MISC Check fs tid; Freestyle lite; DM2, E11.9      acetaminophen (TYLENOL) 325 MG tablet Take by mouth every 4 hours as needed      ergocalciferol (ERGOCALCIFEROL) 1.25 MG (59906 UT) capsule TAKE 1 CAP BY MOUTH EVERY SEVEN (7) DAYS FOR 90 DAYS. Medications patient taking as of now reconciled against medications ordered at time of hospital discharge: Yes    Review of Systems   Constitutional:  Positive for fatigue. Negative for chills and fever.    HENT:  Positive for trouble swallowing (mild; thin liquids; working with [address] [place] [address] ). Negative for congestion, ear pain and sore throat. Eyes:  Negative for visual disturbance. Respiratory:  Positive for shortness of breath (at baseline; followed by pulmonary). Negative for cough and wheezing. Cardiovascular:  Positive for leg swelling. Negative for chest pain and palpitations. Gastrointestinal:  Negative for abdominal pain, diarrhea, nausea and vomiting. Genitourinary:  Negative for dysuria and hematuria. Musculoskeletal:  Negative for gait problem. Neurological:  Positive for speech difficulty (improving). Negative for dizziness, facial asymmetry, weakness, light-headedness and headaches. Objective:    BP (!) 150/82 (Site: Left Upper Arm, Position: Sitting, Cuff Size: Large Adult)   Pulse 70   Temp 97.2 °F (36.2 °C) (Temporal)   Resp 16   Ht 5' 2\" (1.575 m)   Wt 182 lb (82.6 kg)   SpO2 98%   BMI 33.29 kg/m²     Physical Exam  Vitals and nursing note reviewed. Constitutional:       General: She is not in acute distress. Appearance: Normal appearance. HENT:      Right Ear: Tympanic membrane, ear canal and external ear normal.      Left Ear: Tympanic membrane, ear canal and external ear normal.      Nose: Nose normal.      Mouth/Throat:      Mouth: Mucous membranes are moist.      Pharynx: Oropharynx is clear. Eyes:      Extraocular Movements: Extraocular movements intact. Pupils: Pupils are equal, round, and reactive to light. Cardiovascular:      Rate and Rhythm: Normal rate and regular rhythm. Pulmonary:      Effort: Pulmonary effort is normal. No respiratory distress. Breath sounds: Normal breath sounds. Abdominal:      General: Bowel sounds are normal.      Palpations: Abdomen is soft. Musculoskeletal:      Right lower leg: Edema (1+) present. Left lower leg: Edema (1+) present. Skin:     General: Skin is warm and dry. Neurological:      Mental Status: She is alert and oriented to person, place, and time. Psychiatric:         Mood and Affect: Mood normal.         An electronic signature was used to authenticate this note.   --ESTHER Gerardo - SARA          On the basis of positive falls risk screening, assessment and plan is as follows:  patient is working with home health PT .

## 2023-02-21 ASSESSMENT — ENCOUNTER SYMPTOMS
VOMITING: 0
SORE THROAT: 0
TROUBLE SWALLOWING: 1
COUGH: 0
NAUSEA: 0
SHORTNESS OF BREATH: 1
DIARRHEA: 0
ABDOMINAL PAIN: 0
WHEEZING: 0

## 2023-03-31 ENCOUNTER — INITIAL CONSULT (OUTPATIENT)
Dept: CARDIOLOGY CLINIC | Age: 78
End: 2023-03-31

## 2023-03-31 VITALS
BODY MASS INDEX: 35.12 KG/M2 | SYSTOLIC BLOOD PRESSURE: 136 MMHG | WEIGHT: 186 LBS | HEIGHT: 61 IN | DIASTOLIC BLOOD PRESSURE: 84 MMHG | HEART RATE: 77 BPM

## 2023-03-31 DIAGNOSIS — R07.89 ATYPICAL CHEST PAIN: ICD-10-CM

## 2023-03-31 DIAGNOSIS — E78.5 HYPERLIPIDEMIA, UNSPECIFIED HYPERLIPIDEMIA TYPE: ICD-10-CM

## 2023-03-31 DIAGNOSIS — Z86.79 HISTORY OF SUPRAVENTRICULAR TACHYCARDIA: Primary | ICD-10-CM

## 2023-03-31 DIAGNOSIS — R06.09 DOE (DYSPNEA ON EXERTION): ICD-10-CM

## 2023-03-31 DIAGNOSIS — I48.92 ATRIAL FLUTTER, UNSPECIFIED TYPE (HCC): ICD-10-CM

## 2023-03-31 DIAGNOSIS — I10 HYPERTENSION, UNSPECIFIED TYPE: ICD-10-CM

## 2023-03-31 DIAGNOSIS — I49.3 PVC'S (PREMATURE VENTRICULAR CONTRACTIONS): ICD-10-CM

## 2023-03-31 RX ORDER — MECLIZINE HYDROCHLORIDE 25 MG/1
TABLET ORAL
COMMUNITY
Start: 2022-08-19

## 2023-03-31 NOTE — PROGRESS NOTES
[facility] CARDIOLOGY History & Physical                 Reason for Visit: Atrial flutter    Subjective:     Patient is a 78 y.o. female with a PMH of SVT, bilateral carotid atherosclerosis, persistent atrial flutter, hypertension, hyperlipidemia, asthma, CVA, and diabetes who presents as a referral for atrial flutter. She had a TTE in 2023 that was noted to demonstrate a normal EF and mild MR. Her RV function was noted to be mildly reduced. She was hospitalized in 2023 with a CVA. She was noted to be in atrial flutter and was discharged on Eliquis. She follows with pulmonology at [address] for pulmonary hypertension though a RHC is not readily apparent in the EMR. The patient visited Dr. Suad Lima in 2019. The patient is moving to [place] in May 2023. She reports shortness of breath at rest as well as LANDERS. The patient is not adherent to Eliquis. She reports a \"tightness\" in the chest occasionally. She also reports palpitations.     Past Medical History:   Diagnosis Date    Arthritis     Bilateral cataracts     Deafness in right ear     History of seasonal allergies     Hypertension     Irritable bowel syndrome     Multinodular goiter     Type 2 diabetes mellitus ([facility]       Past Surgical History:   Procedure Laterality Date    APPENDECTOMY       SECTION      x4    CHOLECYSTECTOMY      COLONOSCOPY  2010    ORTHOPEDIC SURGERY      left shoulder surgery    SHOULDER ARTHROSCOPY Right     SHAHIDA AND BSO (CERVIX REMOVED)      TONSILLECTOMY        Family History   Problem Relation Age of Onset    Breast Cancer Maternal Aunt 58    Breast Cancer Maternal Grandmother 79    Thyroid Disease Maternal Aunt         treated with thyroidectomy    Breast Cancer Maternal Aunt 60    Ovarian Cancer Maternal Cousin     Breast Cancer Daughter 29    Thyroid Cancer Neg Hx       Social History     Tobacco Use    Smoking status: Never    Smokeless tobacco: Never    Tobacco comments:     Quit

## 2023-04-10 PROBLEM — R06.09 DOE (DYSPNEA ON EXERTION): Status: RESOLVED | Noted: 2023-03-31 | Resolved: 2023-04-10

## 2023-04-10 PROBLEM — I27.20 PULMONARY HYPERTENSION (HCC): Status: ACTIVE | Noted: 2023-04-10

## 2023-04-10 PROBLEM — Z86.73 HISTORY OF CVA (CEREBROVASCULAR ACCIDENT): Status: ACTIVE | Noted: 2023-04-10

## 2023-04-10 PROBLEM — R07.89 ATYPICAL CHEST PAIN: Status: RESOLVED | Noted: 2023-03-31 | Resolved: 2023-04-10

## 2023-04-21 ENCOUNTER — TELEPHONE (OUTPATIENT)
Dept: CARDIOLOGY CLINIC | Age: 78
End: 2023-04-21

## 2023-04-21 DIAGNOSIS — I51.7 LVH (LEFT VENTRICULAR HYPERTROPHY): Primary | ICD-10-CM

## 2023-04-21 DIAGNOSIS — I51.9 RIGHT VENTRICULAR DYSFUNCTION: ICD-10-CM

## 2023-04-21 NOTE — TELEPHONE ENCOUNTER
----- Message from Kia Morrison MD sent at 4/21/2023  3:59 PM EDT -----  Please let the patient know that her EF is noted to be normal.  She is noted to have an LV wall thickness >1.5 cm with RV dysfunction. She has close follow up to further discuss.

## 2023-04-21 NOTE — TELEPHONE ENCOUNTER
Informed patient of Dr Fabby Handy response with reminder of follow up appt to further discuss. Patient verbalized understanding. Denies any questions or concerns at this time.

## 2023-05-17 NOTE — PROGRESS NOTES
[facility] CARDIOLOGY Follow Up                 Reason for Visit: Atrial flutter    Subjective:     Patient is a 78 y.o. female with a PMH of SVT, bilateral carotid atherosclerosis, longstanding persistent atrial fibrillation, hypertension, hyperlipidemia, asthma, CVA, and diabetes who presents for follow-up. The patient was last seen in 2023. It was noted that she had atrial flutter of unknown duration though persistent since at least 2022. Eliquis was resumed. An MPI and TTE was ordered for LANDERS and atypical chest pain as well as PVCs. The patient had an MPI in 2023 that was noted to demonstrate a normal perfusion. She had a TTE in 2023 that was noted to demonstrate a normal EF. Her RV function was noted to be mildly reduced. She was also noted to have a 1.6 cm IVS and 1.4 cm LVPW. She does report lower extremity edema. She is not wearing her compression stockings today.     Past Medical History:   Diagnosis Date    Arthritis     Bilateral cataracts     Deafness in right ear     History of seasonal allergies     Hypertension     Irritable bowel syndrome     Multinodular goiter     Type 2 diabetes mellitus ([facility] [address]       Past Surgical History:   Procedure Laterality Date    APPENDECTOMY       SECTION      x4    CHOLECYSTECTOMY      COLONOSCOPY  2010    ORTHOPEDIC SURGERY      left shoulder surgery    SHOULDER ARTHROSCOPY Right     SHAHIDA AND BSO (CERVIX REMOVED)      TONSILLECTOMY        Family History   Problem Relation Age of Onset    Breast Cancer Maternal Aunt 62    Breast Cancer Maternal Grandmother 79    Thyroid Disease Maternal Aunt         treated with thyroidectomy    Breast Cancer Maternal Aunt 60    Ovarian Cancer Maternal Cousin     Breast Cancer Daughter 29    Thyroid Cancer Neg Hx       Social History     Tobacco Use    Smoking status: Never    Smokeless tobacco: Never    Tobacco comments:     Quit smoking: secondhand exposure as a child   Substance Use Topics

## 2023-05-19 ENCOUNTER — OFFICE VISIT (OUTPATIENT)
Age: 78
End: 2023-05-19

## 2023-05-19 VITALS
HEIGHT: 61 IN | HEART RATE: 61 BPM | DIASTOLIC BLOOD PRESSURE: 86 MMHG | WEIGHT: 176 LBS | BODY MASS INDEX: 33.23 KG/M2 | SYSTOLIC BLOOD PRESSURE: 134 MMHG

## 2023-05-19 DIAGNOSIS — I51.7 LVH (LEFT VENTRICULAR HYPERTROPHY): ICD-10-CM

## 2023-05-19 DIAGNOSIS — E78.5 HYPERLIPIDEMIA, UNSPECIFIED HYPERLIPIDEMIA TYPE: ICD-10-CM

## 2023-05-19 DIAGNOSIS — I10 HYPERTENSION, UNSPECIFIED TYPE: ICD-10-CM

## 2023-05-19 DIAGNOSIS — Z86.79 HISTORY OF SUPRAVENTRICULAR TACHYCARDIA: Primary | ICD-10-CM

## 2023-05-19 DIAGNOSIS — I50.32 CHRONIC HEART FAILURE WITH PRESERVED EJECTION FRACTION (HFPEF) (HCC): ICD-10-CM

## 2023-05-19 DIAGNOSIS — I48.11 LONGSTANDING PERSISTENT ATRIAL FIBRILLATION (HCC): ICD-10-CM

## 2023-05-19 DIAGNOSIS — I65.23 CAROTID ATHEROSCLEROSIS, BILATERAL: ICD-10-CM

## 2023-05-19 RX ORDER — FUROSEMIDE 20 MG/1
20 TABLET ORAL DAILY
COMMUNITY
Start: 2023-04-26

## 2023-10-09 DIAGNOSIS — E11.21 TYPE 2 DIABETES WITH NEPHROPATHY (HCC): ICD-10-CM

## 2023-10-09 DIAGNOSIS — E11.42 TYPE 2 DIABETES MELLITUS WITH DIABETIC POLYNEUROPATHY, WITHOUT LONG-TERM CURRENT USE OF INSULIN (HCC): ICD-10-CM

## 2023-10-09 RX ORDER — SITAGLIPTIN 100 MG/1
100 TABLET, FILM COATED ORAL DAILY
Qty: 90 TABLET | Refills: 3 | OUTPATIENT
Start: 2023-10-09

## 2024-01-04 ENCOUNTER — NEW REFERRAL (OUTPATIENT)
Dept: URBAN - METROPOLITAN AREA CLINIC 23 | Facility: CLINIC | Age: 79
End: 2024-01-04

## 2024-01-04 DIAGNOSIS — Z79.4: ICD-10-CM

## 2024-01-04 DIAGNOSIS — E11.3412: ICD-10-CM

## 2024-01-04 DIAGNOSIS — E11.3511: ICD-10-CM

## 2024-01-04 DIAGNOSIS — H43.11: ICD-10-CM

## 2024-01-04 PROCEDURE — 99204 OFFICE O/P NEW MOD 45 MIN: CPT | Mod: 25

## 2024-01-04 PROCEDURE — 92134 CPTRZ OPH DX IMG PST SGM RTA: CPT | Mod: NC

## 2024-01-04 PROCEDURE — 67028 INJECTION EYE DRUG: CPT

## 2024-01-04 PROCEDURE — 92235 FLUORESCEIN ANGRPH MLTIFRAME: CPT

## 2024-01-04 PROCEDURE — 92250 FUNDUS PHOTOGRAPHY W/I&R: CPT

## 2024-01-04 ASSESSMENT — VISUAL ACUITY
OD_SC: 20/100
OD_PH: 20/70-2
OS_SC: 20/20

## 2024-01-04 ASSESSMENT — TONOMETRY
OS_IOP_MMHG: 15
OD_IOP_MMHG: 16

## 2024-02-01 ENCOUNTER — FOLLOW UP (OUTPATIENT)
Dept: URBAN - METROPOLITAN AREA CLINIC 23 | Facility: CLINIC | Age: 79
End: 2024-02-01

## 2024-02-01 DIAGNOSIS — H43.11: ICD-10-CM

## 2024-02-01 DIAGNOSIS — E11.3511: ICD-10-CM

## 2024-02-01 DIAGNOSIS — E11.3412: ICD-10-CM

## 2024-02-01 PROCEDURE — HD EYLEA HD 8MG: Mod: JZ

## 2024-02-01 PROCEDURE — 92014 COMPRE OPH EXAM EST PT 1/>: CPT | Mod: 25

## 2024-02-01 PROCEDURE — 92134 CPTRZ OPH DX IMG PST SGM RTA: CPT

## 2024-02-01 PROCEDURE — 67028 INJECTION EYE DRUG: CPT | Mod: 50

## 2024-02-01 ASSESSMENT — VISUAL ACUITY
OD_SC: 20/70+1
OS_SC: 20/30-3
OD_PH: 20/40-1

## 2024-02-01 ASSESSMENT — TONOMETRY
OD_IOP_MMHG: 9
OS_IOP_MMHG: 6

## 2024-02-29 ENCOUNTER — FOLLOW UP (OUTPATIENT)
Dept: URBAN - METROPOLITAN AREA CLINIC 23 | Facility: CLINIC | Age: 79
End: 2024-02-29

## 2024-02-29 DIAGNOSIS — E11.3511: ICD-10-CM

## 2024-02-29 DIAGNOSIS — H43.11: ICD-10-CM

## 2024-02-29 DIAGNOSIS — E11.3412: ICD-10-CM

## 2024-02-29 PROCEDURE — 92134 CPTRZ OPH DX IMG PST SGM RTA: CPT

## 2024-02-29 PROCEDURE — 92014 COMPRE OPH EXAM EST PT 1/>: CPT | Mod: 25

## 2024-02-29 PROCEDURE — HD EYLEA HD 8MG: Mod: JZ

## 2024-02-29 PROCEDURE — 67028 INJECTION EYE DRUG: CPT | Mod: 50

## 2024-02-29 ASSESSMENT — TONOMETRY
OD_IOP_MMHG: 18
OS_IOP_MMHG: 17

## 2024-02-29 ASSESSMENT — VISUAL ACUITY
OD_SC: 20/30-2
OS_SC: 20/25-1

## 2024-04-18 ENCOUNTER — FOLLOW UP (OUTPATIENT)
Dept: URBAN - METROPOLITAN AREA CLINIC 23 | Facility: CLINIC | Age: 79
End: 2024-04-18

## 2024-04-18 DIAGNOSIS — E11.3412: ICD-10-CM

## 2024-04-18 DIAGNOSIS — E11.3511: ICD-10-CM

## 2024-04-18 DIAGNOSIS — H43.11: ICD-10-CM

## 2024-04-18 PROCEDURE — 92134 CPTRZ OPH DX IMG PST SGM RTA: CPT

## 2024-04-18 PROCEDURE — 92014 COMPRE OPH EXAM EST PT 1/>: CPT | Mod: 25

## 2024-04-18 PROCEDURE — 67028 INJECTION EYE DRUG: CPT | Mod: 50

## 2024-04-18 ASSESSMENT — TONOMETRY
OD_IOP_MMHG: 14
OS_IOP_MMHG: 12

## 2024-04-18 ASSESSMENT — VISUAL ACUITY
OD_PH: 20/30+2
OS_SC: 20/20-3
OD_SC: 20/40-1

## 2024-06-11 ENCOUNTER — FOLLOW UP (OUTPATIENT)
Dept: URBAN - METROPOLITAN AREA CLINIC 23 | Facility: CLINIC | Age: 79
End: 2024-06-11

## 2024-06-11 DIAGNOSIS — E11.3412: ICD-10-CM

## 2024-06-11 DIAGNOSIS — E11.3511: ICD-10-CM

## 2024-06-11 DIAGNOSIS — H43.11: ICD-10-CM

## 2024-06-11 PROCEDURE — 92134 CPTRZ OPH DX IMG PST SGM RTA: CPT

## 2024-06-11 PROCEDURE — 67028 INJECTION EYE DRUG: CPT | Mod: 50

## 2024-06-11 PROCEDURE — 92014 COMPRE OPH EXAM EST PT 1/>: CPT | Mod: 25

## 2024-06-11 ASSESSMENT — VISUAL ACUITY
OS_SC: 20/20
OD_SC: 20/25+1

## 2024-06-11 ASSESSMENT — TONOMETRY
OD_IOP_MMHG: 17
OS_IOP_MMHG: 12

## 2024-08-27 ENCOUNTER — FOLLOW UP (OUTPATIENT)
Dept: URBAN - METROPOLITAN AREA CLINIC 23 | Facility: CLINIC | Age: 79
End: 2024-08-27

## 2024-08-27 DIAGNOSIS — H43.11: ICD-10-CM

## 2024-08-27 DIAGNOSIS — Z79.4: ICD-10-CM

## 2024-08-27 DIAGNOSIS — E11.3412: ICD-10-CM

## 2024-08-27 DIAGNOSIS — E11.3511: ICD-10-CM

## 2024-08-27 PROCEDURE — 67028 INJECTION EYE DRUG: CPT | Mod: 50

## 2024-08-27 PROCEDURE — 92134 CPTRZ OPH DX IMG PST SGM RTA: CPT

## 2024-08-27 PROCEDURE — 92014 COMPRE OPH EXAM EST PT 1/>: CPT | Mod: 25

## 2024-08-27 ASSESSMENT — VISUAL ACUITY
OD_SC: 20/25+2
OS_SC: 20/20

## 2024-08-27 ASSESSMENT — TONOMETRY
OS_IOP_MMHG: 12
OD_IOP_MMHG: 13

## 2024-12-05 ENCOUNTER — FOLLOW UP (OUTPATIENT)
Dept: URBAN - METROPOLITAN AREA CLINIC 23 | Facility: CLINIC | Age: 79
End: 2024-12-05

## 2024-12-05 DIAGNOSIS — E11.3412: ICD-10-CM

## 2024-12-05 DIAGNOSIS — E11.3511: ICD-10-CM

## 2024-12-05 DIAGNOSIS — H43.11: ICD-10-CM

## 2024-12-05 PROCEDURE — 92250 FUNDUS PHOTOGRAPHY W/I&R: CPT

## 2024-12-05 PROCEDURE — 92014 COMPRE OPH EXAM EST PT 1/>: CPT | Mod: 25

## 2024-12-05 PROCEDURE — 67028 INJECTION EYE DRUG: CPT | Mod: 50

## 2024-12-05 PROCEDURE — 92134 CPTRZ OPH DX IMG PST SGM RTA: CPT | Mod: NC

## 2024-12-05 ASSESSMENT — VISUAL ACUITY
OS_SC: 20/25-2
OD_SC: 20/30+2

## 2024-12-05 ASSESSMENT — TONOMETRY
OS_IOP_MMHG: 14
OD_IOP_MMHG: 14